# Patient Record
Sex: FEMALE | Race: WHITE | NOT HISPANIC OR LATINO | URBAN - METROPOLITAN AREA
[De-identification: names, ages, dates, MRNs, and addresses within clinical notes are randomized per-mention and may not be internally consistent; named-entity substitution may affect disease eponyms.]

---

## 2022-05-02 ENCOUNTER — EMERGENCY (EMERGENCY)
Facility: HOSPITAL | Age: 32
LOS: 1 days | Discharge: HOME | End: 2022-05-02
Attending: STUDENT IN AN ORGANIZED HEALTH CARE EDUCATION/TRAINING PROGRAM
Payer: SELF-PAY

## 2022-05-02 VITALS
HEART RATE: 103 BPM | HEIGHT: 64 IN | SYSTOLIC BLOOD PRESSURE: 107 MMHG | WEIGHT: 110.01 LBS | DIASTOLIC BLOOD PRESSURE: 72 MMHG | TEMPERATURE: 98 F | RESPIRATION RATE: 18 BRPM | OXYGEN SATURATION: 98 %

## 2022-05-02 PROCEDURE — 99283 EMERGENCY DEPT VISIT LOW MDM: CPT

## 2022-05-02 PROCEDURE — 99282 EMERGENCY DEPT VISIT SF MDM: CPT
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
## 2022-05-02 NOTE — ED PROVIDER NOTE - PATIENT PORTAL LINK FT
You can access the FollowMyHealth Patient Portal offered by [facility] by registering at the following website: [email or web address]. By joining [employer]’s FollowMyHealth portal, you will also be able to view your health information using other applications (apps) compatible with our system.
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
## 2022-05-02 NOTE — ED PROVIDER NOTE - PHYSICAL EXAMINATION
Constitutional: Awake, alert, in no acute distress  Eyes: PERRL  HENT: no scalp tenderness or deformity, no facial tenderness, airway patent  Neck: no cervical spine tenderness, no palpable stepoff, no tracheal deviation  CV: no chest wall tenderness, no crepitus or subcutaneous emphysema.  RRR, no murmur, 2+ distal pulses in all extremities  Pulm: non-labored respirations, CTAB  Abdomen: soft, non-tender, non-distended, no ecchymosis  Back: no spinal tenderness, no palpable stepoff  Extremities: Mild right forearm tenderness. Full ROM of elbow, forearm, and wrist. stable pelvis, no extremity tenderness or deformity. Distal pulses intact.  Skin: no rash  Neuro: AAOx3, GCS 15, moving all extremities equally, no focal neurologic deficit

## 2022-05-02 NOTE — ED PROVIDER NOTE - PROGRESS NOTE DETAILS
Mc: Patient declining xray at this time. Ambulating independently in ED. Continue to be NV intact and have full ROM. Stable for dc. Patient understands return precautions and f/u.